# Patient Record
Sex: FEMALE | Race: WHITE
[De-identification: names, ages, dates, MRNs, and addresses within clinical notes are randomized per-mention and may not be internally consistent; named-entity substitution may affect disease eponyms.]

---

## 2018-01-09 ENCOUNTER — HOSPITAL ENCOUNTER (OUTPATIENT)
Dept: HOSPITAL 35 - PAIN | Age: 68
Discharge: HOME | End: 2018-01-09
Attending: ANESTHESIOLOGY
Payer: COMMERCIAL

## 2018-01-09 VITALS — WEIGHT: 153.4 LBS | BODY MASS INDEX: 22.72 KG/M2 | HEIGHT: 69.02 IN

## 2018-01-09 VITALS — SYSTOLIC BLOOD PRESSURE: 139 MMHG | DIASTOLIC BLOOD PRESSURE: 84 MMHG

## 2018-01-09 DIAGNOSIS — Z79.899: ICD-10-CM

## 2018-01-09 DIAGNOSIS — G89.29: ICD-10-CM

## 2018-01-09 DIAGNOSIS — M51.36: ICD-10-CM

## 2018-01-09 DIAGNOSIS — M47.817: Primary | ICD-10-CM

## 2018-01-09 DIAGNOSIS — M46.96: ICD-10-CM

## 2018-01-09 DIAGNOSIS — Z79.82: ICD-10-CM

## 2018-01-09 DIAGNOSIS — Z98.890: ICD-10-CM

## 2018-01-16 ENCOUNTER — HOSPITAL ENCOUNTER (OUTPATIENT)
Dept: HOSPITAL 35 - PAIN | Age: 68
Discharge: HOME | End: 2018-01-16
Attending: ANESTHESIOLOGY
Payer: COMMERCIAL

## 2018-01-16 VITALS — BODY MASS INDEX: 22.75 KG/M2 | HEIGHT: 69.02 IN | WEIGHT: 153.6 LBS

## 2018-01-16 VITALS — SYSTOLIC BLOOD PRESSURE: 136 MMHG | DIASTOLIC BLOOD PRESSURE: 87 MMHG

## 2018-01-16 DIAGNOSIS — M51.36: ICD-10-CM

## 2018-01-16 DIAGNOSIS — Z79.82: ICD-10-CM

## 2018-01-16 DIAGNOSIS — M46.96: ICD-10-CM

## 2018-01-16 DIAGNOSIS — M47.817: Primary | ICD-10-CM

## 2018-01-16 DIAGNOSIS — Z79.899: ICD-10-CM

## 2018-01-16 DIAGNOSIS — G89.29: ICD-10-CM

## 2018-01-23 ENCOUNTER — HOSPITAL ENCOUNTER (OUTPATIENT)
Dept: HOSPITAL 35 - PAIN | Age: 68
Discharge: HOME | End: 2018-01-23
Attending: ANESTHESIOLOGY
Payer: COMMERCIAL

## 2018-01-23 VITALS — SYSTOLIC BLOOD PRESSURE: 110 MMHG | DIASTOLIC BLOOD PRESSURE: 75 MMHG

## 2018-01-23 VITALS — BODY MASS INDEX: 22.05 KG/M2 | HEIGHT: 69.02 IN | WEIGHT: 148.9 LBS

## 2018-01-23 DIAGNOSIS — G89.29: ICD-10-CM

## 2018-01-23 DIAGNOSIS — Z79.82: ICD-10-CM

## 2018-01-23 DIAGNOSIS — M51.36: ICD-10-CM

## 2018-01-23 DIAGNOSIS — Z79.899: ICD-10-CM

## 2018-01-23 DIAGNOSIS — M46.96: ICD-10-CM

## 2018-01-23 DIAGNOSIS — M47.817: Primary | ICD-10-CM

## 2018-03-13 ENCOUNTER — HOSPITAL ENCOUNTER (OUTPATIENT)
Dept: HOSPITAL 35 - RAD | Age: 68
End: 2018-03-13
Attending: FAMILY MEDICINE
Payer: COMMERCIAL

## 2018-03-13 DIAGNOSIS — Z12.31: Primary | ICD-10-CM

## 2018-11-20 ENCOUNTER — HOSPITAL ENCOUNTER (OUTPATIENT)
Dept: HOSPITAL 35 - PAIN | Age: 68
Discharge: HOME | End: 2018-11-20
Attending: ANESTHESIOLOGY
Payer: COMMERCIAL

## 2018-11-20 VITALS — DIASTOLIC BLOOD PRESSURE: 91 MMHG | SYSTOLIC BLOOD PRESSURE: 146 MMHG

## 2018-11-20 VITALS — HEIGHT: 69.02 IN | BODY MASS INDEX: 22.93 KG/M2 | WEIGHT: 154.8 LBS

## 2018-11-20 DIAGNOSIS — G89.29: ICD-10-CM

## 2018-11-20 DIAGNOSIS — I10: ICD-10-CM

## 2018-11-20 DIAGNOSIS — Z79.82: ICD-10-CM

## 2018-11-20 DIAGNOSIS — Z98.890: ICD-10-CM

## 2018-11-20 DIAGNOSIS — M51.36: Primary | ICD-10-CM

## 2018-11-20 DIAGNOSIS — Z87.891: ICD-10-CM

## 2018-11-20 DIAGNOSIS — M46.96: ICD-10-CM

## 2018-11-20 DIAGNOSIS — Z79.899: ICD-10-CM

## 2018-11-20 DIAGNOSIS — M47.817: ICD-10-CM

## 2018-12-05 ENCOUNTER — HOSPITAL ENCOUNTER (OUTPATIENT)
Dept: HOSPITAL 35 - PAIN | Age: 68
Discharge: HOME | End: 2018-12-05
Attending: ANESTHESIOLOGY
Payer: COMMERCIAL

## 2018-12-05 VITALS — HEIGHT: 69.02 IN | WEIGHT: 153.4 LBS | BODY MASS INDEX: 22.72 KG/M2

## 2018-12-05 VITALS — SYSTOLIC BLOOD PRESSURE: 116 MMHG | DIASTOLIC BLOOD PRESSURE: 82 MMHG

## 2018-12-05 DIAGNOSIS — G89.29: ICD-10-CM

## 2018-12-05 DIAGNOSIS — Z87.891: ICD-10-CM

## 2018-12-05 DIAGNOSIS — M46.96: ICD-10-CM

## 2018-12-05 DIAGNOSIS — M51.36: ICD-10-CM

## 2018-12-05 DIAGNOSIS — Z98.890: ICD-10-CM

## 2018-12-05 DIAGNOSIS — Z79.899: ICD-10-CM

## 2018-12-05 DIAGNOSIS — M47.817: Primary | ICD-10-CM

## 2018-12-05 DIAGNOSIS — I10: ICD-10-CM

## 2018-12-05 DIAGNOSIS — Z79.82: ICD-10-CM

## 2018-12-12 ENCOUNTER — HOSPITAL ENCOUNTER (OUTPATIENT)
Dept: HOSPITAL 35 - PAIN | Age: 68
Discharge: HOME | End: 2018-12-12
Attending: ANESTHESIOLOGY
Payer: COMMERCIAL

## 2018-12-12 VITALS — HEIGHT: 69.02 IN | BODY MASS INDEX: 23.08 KG/M2 | WEIGHT: 155.8 LBS

## 2018-12-12 VITALS — DIASTOLIC BLOOD PRESSURE: 90 MMHG | SYSTOLIC BLOOD PRESSURE: 130 MMHG

## 2018-12-12 DIAGNOSIS — Z87.891: ICD-10-CM

## 2018-12-12 DIAGNOSIS — Z79.899: ICD-10-CM

## 2018-12-12 DIAGNOSIS — Z79.82: ICD-10-CM

## 2018-12-12 DIAGNOSIS — G89.29: ICD-10-CM

## 2018-12-12 DIAGNOSIS — M12.88: ICD-10-CM

## 2018-12-12 DIAGNOSIS — M51.16: Primary | ICD-10-CM

## 2018-12-12 DIAGNOSIS — M47.27: ICD-10-CM

## 2018-12-12 DIAGNOSIS — I10: ICD-10-CM

## 2019-03-19 ENCOUNTER — HOSPITAL ENCOUNTER (OUTPATIENT)
Dept: HOSPITAL 35 - RAD | Age: 69
End: 2019-03-19
Attending: FAMILY MEDICINE
Payer: COMMERCIAL

## 2019-03-19 DIAGNOSIS — Z12.31: Primary | ICD-10-CM

## 2019-11-19 ENCOUNTER — HOSPITAL ENCOUNTER (OUTPATIENT)
Dept: HOSPITAL 35 - PAIN | Age: 69
Discharge: HOME | End: 2019-11-19
Attending: ANESTHESIOLOGY
Payer: COMMERCIAL

## 2019-11-19 VITALS — SYSTOLIC BLOOD PRESSURE: 129 MMHG | DIASTOLIC BLOOD PRESSURE: 85 MMHG

## 2019-11-19 VITALS — WEIGHT: 154.2 LBS | HEIGHT: 69.02 IN | BODY MASS INDEX: 22.84 KG/M2

## 2019-11-19 DIAGNOSIS — Z87.891: ICD-10-CM

## 2019-11-19 DIAGNOSIS — M47.816: ICD-10-CM

## 2019-11-19 DIAGNOSIS — M16.0: ICD-10-CM

## 2019-11-19 DIAGNOSIS — Z98.890: ICD-10-CM

## 2019-11-19 DIAGNOSIS — Z79.899: ICD-10-CM

## 2019-11-19 DIAGNOSIS — M51.36: ICD-10-CM

## 2019-11-19 DIAGNOSIS — G89.29: ICD-10-CM

## 2019-11-19 DIAGNOSIS — M47.817: Primary | ICD-10-CM

## 2019-11-19 DIAGNOSIS — Z79.82: ICD-10-CM

## 2019-11-19 NOTE — NUR
Pain Clinic Assessment:
 
1. History of Osteoarthritis:
LOW BACK
BILATERAL HIPS
   History of Rheumatoid Arthritis:
NO
 
2. Height: 5 ft. 9 in. 175.3 cm.
   Weight: 154.2 lb.  oz. 69.945 kg.
   Patient's BMI: 22.8
 
3. Vital Signs:
   BP: 129/85 Pulse: 69 Resp: 18
   Temp:  02 Sat: 96 ECG Mon:
 
4. Pain Intensity: 1 THIS MORNING. 5 MOVING
 
5. Fall Risk:
   Dizziness: N  Needs help standing or walking: N
   Fallen in the last 3 months: N
   Fall risk comments:
 
 
6. Patient on Blood Thinner: None
 
7. History of Hypertension: Y
 
8. Opioid Therapy greater than 6 weeks: N
   Opiate Contract Signed:
 
9. Risk Assessment Tool Provided: LOW RISK 0/3
 
10. Functional Assessment Tool: 22/70
 
11. Recreational Drug Use: Past greater than 3 mos Drug Type:
    Tobacco Use: Former Smoker Tobacco Type:
       Amount or Packs/day:  How Many Years:
    Alcohol Use: Yes  Frequency: Weekly Quant: 2-3 SHAYNE

## 2019-11-26 ENCOUNTER — HOSPITAL ENCOUNTER (OUTPATIENT)
Dept: HOSPITAL 35 - PAIN | Age: 69
Discharge: HOME | End: 2019-11-26
Attending: ANESTHESIOLOGY
Payer: COMMERCIAL

## 2019-11-26 VITALS — WEIGHT: 157 LBS | BODY MASS INDEX: 23.25 KG/M2 | HEIGHT: 69.02 IN

## 2019-11-26 VITALS — DIASTOLIC BLOOD PRESSURE: 82 MMHG | SYSTOLIC BLOOD PRESSURE: 117 MMHG

## 2019-11-26 DIAGNOSIS — I10: ICD-10-CM

## 2019-11-26 DIAGNOSIS — G89.29: ICD-10-CM

## 2019-11-26 DIAGNOSIS — Z87.891: ICD-10-CM

## 2019-11-26 DIAGNOSIS — Z79.899: ICD-10-CM

## 2019-11-26 DIAGNOSIS — M47.816: ICD-10-CM

## 2019-11-26 DIAGNOSIS — Z98.890: ICD-10-CM

## 2019-11-26 DIAGNOSIS — M47.817: Primary | ICD-10-CM

## 2019-11-26 DIAGNOSIS — M51.36: ICD-10-CM

## 2019-11-26 DIAGNOSIS — Z79.82: ICD-10-CM

## 2019-11-26 NOTE — HPC
[facility]
[address]
[place]   [address]                     PAIN MANAGEMENT CONSULTATION  
_______________________________________________________________________________
 
Name:       WILLARD MARCOS           Room #:                     REG [facility].#:      [id number]                       Account #:      [id number]  
Admission:  11/19/19    Attend Phys:    Bernardo Olivo DO  
Discharge:              Date of Birth:  [birth year]  
                                                          Report #: [id number]
                                                                    [id number]   
_______________________________________________________________________________
THIS REPORT FOR:   //name//                          
 
CC: Bernardo Gomez
 
DATE OF SERVICE:  11/19/2019
 
 
REFERRING PHYSICIAN:  Rajesh Gomez MD
 
CHIEF COMPLAINT:  Axial back pain.
 
HISTORY OF PRESENT ILLNESS:  As you know, the patient is a 69-year-old female
who returns today in followup visit reporting very slow and recurring axial back
pain due to facet arthropathy of the lumbar spine.  The patient underwent
radiofrequency lesioning of the medial branch nerves of the lumbar spine at our
visit of 12/12/2018 with good and prolonged benefit.  She reports improvement
with that of 100% for nearly 8 months with a slow and progressive return of
pain.  She returns today to undergo medial branch blocks in hopes of progressing
towards radiofrequency lesioning.  The patient indicates no new injury or
trauma.  Pain score today is rated at 1/10 up to 5/10 with ambulation.
 
ALLERGIES:  No known drug allergies.
 
CURRENT MEDICATIONS:  [place] Matrix 5000 mg ER, aspirin 81 mg per day, omega-3
fish oil 1000 mg once a day, cholecalciferol 5000 units once a day, ibuprofen
400 mg b.i.d., lisinopril 20 mg per day.
 
SOCIAL HISTORY:  The patient denies tobacco, IV or illicit drug use.  Admits to
an intermittent alcohol beverage.  She is accompanied by her  present in
room today.  She is retired.
 
IMAGING:  No new imaging available.
 
PQRS:  The patient has known arthritic changes of the lumbar spine, bilateral
hips, no rheumatoid arthritis.  She is placing pain score today anywhere from
1-5/10 depending on activity.  She is not a fall risk, has not had a fall in
last 3 months, not on blood thinners, but is treated for hypertension.  She is
not on chronic opioids and has a low opioid addiction potential.  Pain impact
score 22/70, mild interference of daily activities secondary to pain.
 
PHYSICAL EXAMINATION:
VITAL SIGNS:  Blood pressure 129/85, pulse 69, respiratory rate 18 and
unlabored.  The patient is 96% on room air.  Height 5 feet 9 inches tall, weight
154.2 pounds, BMI calculated 22.8.
 
 
 
[address]   [address]                     PAIN MANAGEMENT CONSULTATION  
_______________________________________________________________________________
 
Name:       WILLARD MARCOS           Room #:                     REG CLI 
[facility]#:      [id number]                       Account #:      [id number]  
Admission:  11/19/19    Attend Phys:    Bernardo Olivo DO  
Discharge:              Date of Birth:  [birth year]  
                                                          Report #: [id number]
                                                                    [id number]   
_______________________________________________________________________________
GENERAL:  Well-developed, well-nourished, well-hydrated 69-year-old female
appearing stated age.  Pain is rated today around 1-5/10 depending on activity.
HEENT:  Normocephalic, atraumatic.  Pupils equal, round, reactive to light.
EXTREMITIES:  Show no clubbing, no cyanosis, no edema.
MUSCULOSKELETAL:  Lower extremity strength equal and symmetrical 5/5, intact to
light touch from L1 through S2 dermatomes.  Seated straight leg raising
negative.  Supine straight leg raising negative.  Modified Gaenslen's positive
for axial low back pain.  Lumbar provocation testing is met with increasing
pain.
 
ASSESSMENT:
1.  Lumbosacral spondylosis without radicular symptoms.
2.  Facet arthropathy of the lumbar spine.
3.  Degeneration of lumbar spine.
4.  Chronic intractable pain.
 
PLAN:
1.  The patient returns today in followup visit to undergo medial branch nerve
blocks with plans to undergo radiofrequency lesioning of medial branch nerves if
this is successful.  She has had a slow and progressive return of symptoms, of
100% improvement in overall pain with previous radiofrequency lesioning
performed in December.  She returns today for medial branch blocks with plans to
move forward with a radiofrequency lesioning if this is successful.  She has
been advised of risks and benefits of procedure, states understood and wished to
proceed.
2.  No medication changes made at today's visit.  The patient will continue
current medical therapy.
3.  We will see the patient back in followup visit if she receives greater than
80% improvement in overall pain with the medial branch blocks today to undergo
radiofrequency lesioning of either the right or left side initially
 
DESCRIPTION OF PROCEDURE:  Bilateral L3, L4, L5 medial branch blocks under
fluoroscopic guidance.
 
This is the first of 2 diagnostic medial branch blocks on the right and left
sides that the patient is undergoing.
 
After obtaining written consent, the patient was taken back to the fluoroscopy
suite and placed in a prone position on the fluoroscopy table with a pillow
under the abdomen to decrease the lumbar lordosis.  The skin overlying the
lumbosacral area was prepped and draped in an aseptic fashion.  The L4
transverse process corresponding to the L3 medial branch nerve and the L5
transverse process corresponding to the L4 medial branch nerve on the right and
left sides were visualized under AP fluoroscopy.  The skin and subcutaneous
tissue overlying the target sites of injection was anesthetized using 2 mL of 1%
lidocaine.  A 22-gauge 3-1/2 inch spinal needle with a bent tip was advanced
 
 
 
[address]   [address]                     PAIN MANAGEMENT CONSULTATION  
_______________________________________________________________________________
 
Name:       WILLARD MARCOS           Room #:                     REG [facility]#:      [id number]                       Account #:      [id number]  
Admission:  11/19/19    Attend Phys:    Bernardo Olivo DO  
Discharge:              Date of Birth:  [birth year]  
                                                          Report #: [id number]
                                                                    [id number]   
_______________________________________________________________________________
under fluoroscopic guidance using a superior to inferior and lateral to medial
approach to the dorsal, superior and medial aspect of the base of the transverse
processes.  The needles were then directed ventral, medial and caudad to reach
the target locations.  An oblique view facilitated needle placement with
properly positioned needless in the middle of the "eye" of the German dog for
the medial branch blocks.  At each site the needles rested on periosteum.  After
negative aspiration for heme or CSF, 0.2 mL of Omnipaque dye was injected at
each site under live fluoroscopy, demonstrating absence of vascular uptake. 
After negative aspiration for heme or CSF, 0.5 mL of bupivacaine 0.5% was slowly
injected at each site to avoid forcing the solution away from the target points.
 The needles were then removed.
 
The L5 dorsal ramus block on the right and left sides was performed using a
slightly oblique approach under fluoroscopic guidance, placing the needle within
the groove between the sacral site and the superior articular process of S1. 
The needle rested on periosteum.  After negative aspiration for heme or CSF, 0.2
mL of Omnipaque dye was injected at under live fluoroscopy, demonstrating
absence of vascular uptake.  After negative aspiration for heme or CSF, 0.5 mL
of bupivacaine 0.5% was slowly injected to avoid forcing the solution away from
the target point.  The needle was then removed.  Sterile bandages were placed
over the injection site.
 
There were no apparent complications.  The patient tolerated the procedure well
and was carefully escorted to the recovery room in stable condition.  The VAS
was 5/10 before the procedure and 0/10, 10 minutes after the procedure.  After
meeting discharge criteria, the patient was discharged home.
 
 
 
 
 
 
 
 
 
 
 
 
 
 
 
 
 
 
  <ELECTRONICALLY SIGNED>
   By: Bernardo Olivo DO          
  11/26/19     1627
D: 11/20/19 1008                           _____________________________________
T: 11/20/19 1246                           Bernardo Olivo DO            /nt

## 2019-11-26 NOTE — NUR
Pain Clinic Assessment:
 
1. History of Osteoarthritis:
LOW BACK
BILATERAL HIPS
   History of Rheumatoid Arthritis:
NO
 
2. Height: 5 ft. 9 in. 175.3 cm.
   Weight: 157.0 lb.  oz. 71.215 kg.
   Patient's BMI: 23.2
 
3. Vital Signs:
   BP: 117/82 Pulse: 80 Resp: 16
   Temp:  02 Sat: 97 ECG Mon:
 
4. Pain Intensity: 2 THIS MORNING. 8 LIFT
 
5. Fall Risk:
   Dizziness: N  Needs help standing or walking: N
   Fallen in the last 3 months: N
   Fall risk comments:
 
 
6. Patient on Blood Thinner: None
 
7. History of Hypertension: Y
 
8. Opioid Therapy greater than 6 weeks: N
   Opiate Contract Signed:
 
9. Risk Assessment Tool Provided: LOW RISK 0/3
 
10. Functional Assessment Tool: 22/70
 
11. Recreational Drug Use: Past greater than 3 mos Drug Type:
    Tobacco Use: Former Smoker Tobacco Type:
       Amount or Packs/day:  How Many Years:
    Alcohol Use: Yes  Frequency:  Quant:

## 2019-11-27 NOTE — HPC
[facility]
[address] [place]   [address]                     PAIN MANAGEMENT CONSULTATION  
_______________________________________________________________________________
 
Name:       WILLARD MARCOS           Room #:                     REG [facility].#:      [id number]                       Account #:      [id number]  
Admission:  11/26/19    Attend Phys:    Bernardo Olivo DO  
Discharge:              Date of Birth:  [birth year]  
                                                          Report #: [id number]
                                                                    [id number]   
_______________________________________________________________________________
THIS REPORT FOR:   //name//                          
 
CC: Bernardo Gomez MD
 
DATE OF SERVICE:  11/26/2019
 
 
CHIEF COMPLAINT:  Axial back pain.
 
HISTORY OF PRESENT ILLNESS:  As you know, the patient is a 69-year-old female
returning in followup visit to begin radiofrequency lesioning of the medial
branch nerves of the lumbar spine.  She has successfully completed medial branch
blocks with good efficacy, noting near 100% improvement in overall pain
initially with the block and a slow progressive return of symptoms to its
baseline level.  She returns today to undergo right L3, L4, L5 medial branch
radiofrequency lesioning.  If this is unsuccessful, then move forward with left
L3, L4, L5 radiofrequency lesioning next week.  She indicates today pain level
of around 2-8/10 depending on activity.
 
ALLERGIES:  No known drug allergies.
 
CURRENT MEDICATIONS:  See chart.
 
SOCIAL HISTORY:  The patient denies tobacco, IV or illicit drug use.  Admits to
an occasional alcohol beverage.  She is accompanied by her  present in
room today.
 
IMAGING:  No new imaging available.
 
PQRS:  The patient has known arthritic changes of the lumbar spine, bilateral
hips, no rheumatoid arthritis.  Placing current pain score anywhere from 2-8/10
depending on activities, not a fall risk, has not had a fall in the last 3
months, not on blood thinners, but is treated for hypertension.  She is not on
chronic opioids and does have a low opioid addiction potential.  Pain impact
score 22/70 indicating mild interference of daily activities secondary to pain.
 
PHYSICAL EXAMINATION:
VITAL SIGNS:  Blood pressure 117/82, pulse 80, respiratory rate 16 and
unlabored.  The patient is 97% on room air.  Height 5 feet 9 inches tall, weight
157 pounds, BMI calculated 23.2.
GENERAL:  Well-developed, well-nourished, well-hydrated 69-year-old female
appearing stated age, pain is rated today around 2-8/10 depending on activity.
HEENT:  Normocephalic, atraumatic.  Pupils equal, round, reactive to light. 
Speech fluent.
 
 
 
[address]   [address]                     PAIN MANAGEMENT CONSULTATION  
_______________________________________________________________________________
 
Name:       WILLARD MARCOS           Room #:                     REG [facility] 
[facility]#:      [id number]                       Account #:      [id number]  
Admission:  11/26/19    Attend Phys:    Bernardo Olivo DO  
Discharge:              Date of Birth:  [birth year]  
                                                          Report #: [id number]
                                                                    [id number]   
_______________________________________________________________________________
EXTREMITIES:  Show no clubbing, no cyanosis, and no edema.
MUSCULOSKELETAL:  Lower extremity strength appears symmetrical 5/5, intact to
light touch from L1 through S2 dermatomes.  Seated straight leg raising
negative.  Supine straight leg raising negative.  Modified Gaenslen's positive
for axial low back pain.  Lumbar provocation testing including extension,
rotation, lateral flexion of lumbar spine causes intensification of pain. 
Slight improvement in symptoms with forward flexion.
 
ASSESSMENT:
1.  Lumbosacral spondylosis without radiculopathy.
2.  Facet arthropathy of the lumbar spine.
3.  Degeneration of lumbar spine.
4.  Chronic intractable pain.
 
PLAN:
1.  The patient returns today in followup visit having completed medial branch
blocks with successful improvement in overall pain lasting for the prescribed
amount of time.  She returns today to begin the radiofrequency lesioning
process.  She states the right side is worse than the left and wishes to begin
with the right side addressing the L3, L4, L5 medial branch nerves on the right
with plans to undergo radiofrequency lesioning of the left medial branch nerves
at our followup visit next week.  The patient has been advised risks and
benefits of the procedure, states understood and wished to proceed.
2.  No medication changes made at today's visit.  The patient will continue
current medical therapy as previously prescribed.
3.  We will see the patient back in followup visit next week to undergo left L3,
L4, L5 medial branch radiofrequency lesionings.
 
DESCRIPTION OF PROCEDURE:  Right L3, L4, L5 lumbar median branch radiofrequency
ablations.
 
After the procedure was explained, informed consent was obtained from the
patient.  The patient was informed of the risks of procedure including
infection, bleeding, nerve damage, failure to produce pain relief and
postoperative discomfort lasting for several weeks.  After confirmation of the
consent and understanding of her risks, the patient was then taken back to
fluoroscopy suite, placed in prone position with pillow under abdomen to
decrease lumbar lordosis.  Skin overlying lumbosacral area then prepped and
draped in aseptic fashion.  The L2 through L5 vertebral bodies and the sacral
ala were identified under AP fluoroscopy.  The target locations of the L4
transverse process corresponding to the L3 medial branch nerve and the L5
transverse process corresponding to the L4 medial branch nerve were established.
 Then using a 27-gauge 1-1/4 inch needle, skin wheals were placed at the
junction of the transverse process and the superior articular process of the
caudad vertebrae.  We used 1 mL at each of the sites.  We were careful to
anesthetize only skin and not the deep tissues.  The radiofrequency lesioning
 
 
 
[address]   [address]                     PAIN MANAGEMENT CONSULTATION  
_______________________________________________________________________________
 
Name:       WILLARD MARCOS           [facility] #[facility]                     [facility]      [id number]                       Account #:      [id number]  
Admission:  11/26/19    Attend Phys:    Bernardo Olivo DO  
Discharge:              Date of Birth:  [birth year]  
                                                          Report #: [id number]
                                                                    [id number]   
_______________________________________________________________________________
needles were then advanced under fluoroscopic guidance using a superior to
inferior, lateral to medial approach to the dorsal superior and medial aspect of
the base of the transverse processes.  Needles were then directed caudad to
reach target locations.  Oblique view facilitated needle placement with properly
positioned needles within the middle of the "eye" of the German dog.  At each
site, needles rested on periosteum.  Touching bone initially showed the needles
were not placed too deeply.
 
Radiofrequency lesioning of the L5 medial branch nerve on the right side was
performed using superior to inferior, lateral to medial approach under
fluoroscopic guidance, placing the needle between the groove of the sacral ala
and the superior articular process of S1.  Needle rested on periosteum.
 
Stimulation was performed at each level once the cannulas were in position. 
Sensory stimulation was performed at 0.2, 0.5, and 0.3 with impedance of 150,
148 and 125 at 50 Hz for the L3, L4, L5 medial branch nerves respectively.  Good
stimulation of the lumbar and buttock region was elicited indicating correct
alignment with the posterior primary ramus.  Absence of lower motor
fasciculation was noted at 3 volts 2 Hz stimulation of the L3, L4, L5 medial
branch nerves on the right side.  Following this, affirmation of dissociation
between sensory and motor stimulation, negative aspiration was noted for both
heme or cerebrospinal fluid at each level.  Next, 1 mL bupivacaine 0.5% was
injected slowly.  After a 90-second delay, lesions were performed at 80 degrees
Celsius for 90 seconds.  After the needle tips had cooled, 1 mL of a solution
containing 1 mL 40 mg per mL, 40 mg total triamcinolone and 3 mL of bupivacaine
0.5% was injected slowly.  Needles were retracted intermediate, flushed with 1 mL of
1% lidocaine and removed.  At the end of the procedure, the patient was able to
move all 4 extremities purposely.  Sterile bandages were placed over each
injection sites.
 
The patient tolerated procedure well, carefully escorted to recovery room in
stable condition.  No apparent complications.  After meeting discharge criteria,
the patient discharged home.
 
 
 
 
 
 
 
 
 
 
 
  <ELECTRONICALLY SIGNED>
   By: Bernardo Olivo DO          
  11/27/19     0747
D: 11/26/19 1622                           _____________________________________
T: 11/26/19 2332                           Bernardo Olivo DO            /nt

## 2019-12-03 ENCOUNTER — HOSPITAL ENCOUNTER (OUTPATIENT)
Dept: HOSPITAL 35 - PAIN | Age: 69
Discharge: HOME | End: 2019-12-03
Attending: ANESTHESIOLOGY
Payer: COMMERCIAL

## 2019-12-03 VITALS — SYSTOLIC BLOOD PRESSURE: 124 MMHG | DIASTOLIC BLOOD PRESSURE: 83 MMHG

## 2019-12-03 VITALS — BODY MASS INDEX: 23.25 KG/M2 | WEIGHT: 157 LBS | HEIGHT: 69.02 IN

## 2019-12-03 DIAGNOSIS — I10: ICD-10-CM

## 2019-12-03 DIAGNOSIS — M16.0: ICD-10-CM

## 2019-12-03 DIAGNOSIS — Z87.891: ICD-10-CM

## 2019-12-03 DIAGNOSIS — M47.817: Primary | ICD-10-CM

## 2019-12-03 DIAGNOSIS — G89.29: ICD-10-CM

## 2019-12-03 DIAGNOSIS — Z98.890: ICD-10-CM

## 2019-12-03 DIAGNOSIS — M47.816: ICD-10-CM

## 2019-12-03 DIAGNOSIS — M51.36: ICD-10-CM

## 2019-12-04 NOTE — HPC
[facility]
[place]   [address]                     PAIN MANAGEMENT CONSULTATION  
_______________________________________________________________________________
 
Name:       WILLARD MARCOS           Room #:                     REG [facility].#:      [id number]                       Account #:      [id number]  
Admission:  12/03/19    Attend Phys:    Bernardo Olivo DO  
Discharge:              Date of Birth:  [birth year]  
                                                          Report #: [id number]
                                                                    [id number]   
_______________________________________________________________________________
THIS REPORT FOR:   //name//                          
 
CC: Bernardo Gomez MD
 
DATE OF SERVICE:  12/03/2019
 
 
REFERRING PHYSICIAN:  Rajesh Gomez MD
 
CHIEF COMPLAINT:  Axial back pain.
 
HISTORY OF PRESENT ILLNESS:  As you know, the patient is a very pleasant
69-year-old female returning in followup visit to complete the radiofrequency
lesioning of the medial branch nerves of the lumbar spine.  She has successfully
completed right L3, L4, L5 medial branch radiofrequency lesioning at our last
visit with near 100% improvement in her right low back pain.  She continues to
experience left low back pain for which she places pain score 5/10.  She returns
today to undergo left L3, L4, L5 medial branch radiofrequency lesioning in hopes
of improving axial back pain, as she has seen with previous interventions
provided nearly a year ago.  She returns today in followup visit to complete the
process.  Again, the patient is reporting pain at around 5/10 involving just the
left low back.
 
ALLERGIES:  No known drug allergies.
 
CURRENT MEDICATIONS:  See chart.
 
SOCIAL HISTORY:  The patient denies tobacco, IV or illicit drug use.  Admits
occasional alcohol beverage.  She is accompanied by her  present in room
today.
 
IMAGING:  No new imaging available.
 
PQRS:  The patient has known arthritic changes of the lumbar spine, bilateral
hips, but no rheumatoid arthritis.  She is placing pain intensity today 5/10
involving the left low back.  She is not a fall risk.  She has not had a fall in
last 3 months.  She is not on blood thinners.  She is treated for hypertension. 
She is not on chronic opioids, but does have a low opioid addiction potential
based on our assessment tool.  Pain impact score 22/70 indicating moderate
interference of daily activities secondary to pain.
 
PHYSICAL EXAMINATION:
VITAL SIGNS:  Blood pressure 124/83, pulse 67, respiratory rate 14 and
unlabored.  The patient is 100% on room air.  Height 5 feet 9 inches tall,
 
 
 
[facility]
[address] [place]   [address]                     PAIN MANAGEMENT CONSULTATION  
_______________________________________________________________________________
 
Name:       WILLARD MARCOS           Room #:                     REG [facility].#:      [id number]                       Account #:      [id number]  
Admission:  12/03/19    Attend Phys:    Bernardo Olivo DO  
Discharge:              Date of Birth:  [birth year]  
                                                          Report #: [id number]
                                                                    [id number]   
_______________________________________________________________________________
weight 157 pounds, BMI calculated 23.2.
GENERAL:  Well-developed, well-nourished, well-hydrated 69-year-old female
appearing stated age, placing current pain score 5/10.
HEENT:  Normocephalic, atraumatic.  Pupils equal, round, reactive to light. 
Speech is fluent.
EXTREMITIES:  Show no clubbing, no cyanosis, no edema.
MUSCULOSKELETAL:  Lower extremity strength remains symmetrical 5/5.  Muscle bulk
and tone equal and symmetrical in comparing left lower extremity to right. 
Modified Gaenslen's positive for axial low back pain.  Lumbar provocation
testing including extension, rotation, lateral flexion to the left, all
intensify pain, to the right negative.
 
ASSESSMENT:
1.  Lumbosacral spondylosis without radiculopathy.
2.  Facet arthropathy of the lumbar spine.
3.  Degeneration of lumbar spine.
4.  Chronic intractable pain.
 
PLAN:
1.  The patient returns today in followup visit to complete radiofrequency
lesioning of the medial branch nerves of the lumbar spine.  We have successfully
completed the right side with 100% resolution of right low back pain.  She
returns today in followup visit to undergo left L3, L4, L5 medial branch
radiofrequency lesioning to 5/10 pain residual on the left side.  She has been
advised of the risks and benefits of the procedure, states she understood and
wished to proceed.
2.  No medication changes made at today's visit.  The patient will continue
current medical therapy as previously prescribed.
3.  We will see the patient back in followup visit on an as needed basis if her
pain does return.  We are hopeful the patient will see good and prolonged
benefit with today's radiofrequency lesioning completing the radiofrequency
lesioning of the lumbar spine.
 
 
 
 
 
 
 
 
 
 
 
 
  <ELECTRONICALLY SIGNED>
   By: Bernardo Olivo DO          
  12/04/19     1258
D: 12/03/19 1734                           _____________________________________
T: 12/03/19 2216                           Bernardo Olivo DO            /nt

## 2019-12-04 NOTE — P
[facility]
[place]   [address]                     PROCEDURE REPORT              
_______________________________________________________________________________
 
Name:       WILLARD MARCOS           Room #:                     REG [facility].#:      [id number]                       Account #:      [id number]  
Admission:  12/03/19    Attend Phys:    Bernardo Olivo DO  
Discharge:              Date of Birth:  [birth year]  
                                                          Report #: [id number]
                                                                    [id number]   
_______________________________________________________________________________
THIS REPORT FOR:   //name//                          
 
CC: Bernardo Gomez MD
 
DATE OF SERVICE:  12/03/2019
 
 
PROCEDURE NOTE:
 
PROCEDURE PERFORMED:  Left L3, L4, L5 lumbar medial branch radiofrequency
lesionings.
 
DESCRIPTION OF PROCEDURE:  After the procedure was explained, informed consent
was obtained from the patient.  The patient was informed of the risks of
procedure including infection, bleeding, nerve damage, failure to produce pain
relief and postoperative discomfort lasting for several weeks.  After
confirmation and consent to undergo the procedure, the patient was then taken to
the fluoroscopy suite, placed in a prone position with pillow under abdomen to
decrease lumbar lordosis.  Skin overlying the lumbosacral area then prepped and
draped in aseptic fashion.  The L2 through L5 vertebral bodies and the sacral
ala were identified under AP fluoroscopy.  The target locations of the L4
transverse process corresponding to the L3 medial branch nerve, the L5
transverse process corresponding to the L4 medial branch nerve were established.
 
Using a 27-gauge, 1-1/4 inch needle, skin wheals were placed at the junction of
the transverse process and the superior articular process of the caudad
vertebrae.  We used 1 mL of 1% lidocaine at each site.  We were careful not to
anesthetize anything other than the skin and shallow subcutaneous tissues.
 
The radiofrequency lesioning needles were then advanced under fluoroscopic
guidance using a superior to inferior, lateral to medial approach to the dorsal
superior and medial aspect of the base of the transverse processes.  Needles
were then directed caudad to reach target location.  Oblique view facilitated
needle placement with properly positioned needles within the middle of the "eye"
 of the German dog.  At each site, needles rested on periosteum.  Touching bone
initially assured the needles were not placed too deeply.
 
Radiofrequency lesioning of the L5 medial branch nerve on the left side was
performed using a superior to inferior, lateral to medial approach under direct
fluoroscopic guidance.  We placed the needle within the groove between the
sacral ala and the superior articular process of S1.  Needle rested on
periosteum.
 
Stimulation was performed at each level once the cannulas were in position. 
 
 
 
[address]   [address]                     PROCEDURE REPORT              
_______________________________________________________________________________
 
Name:       WILLARD MARCOS           Room #:                     REG [facility]#:      [id number]                       Account #:      [id number]  
Admission:  12/03/19    Attend Phys:    Bernardo Olivo DO  
Discharge:              Date of Birth:  [birth year]  
                                                          Report #: [id number]
                                                                    [id number]   
_______________________________________________________________________________
Sensory stimulation was performed at 0.4, 0.5 and 0.5 with impedance of 248, 150
and 128 at 50 Hz for the L3, L4, L5 medial branch nerves on the left side
respectively.  Good stimulation of the lumbar and buttock region was elicited
indicating correct alignment with the posterior primary ramus.  Absence of lower
motor fasciculation was noted at 3 volts 2 Hz stimulation of the left L3, L4, L5
medial branch nerves respectively.  Following this, affirmation of dissociation
between sensory and motor stimulation, negative aspiration was noted at both
levels for heme or cerebrospinal fluid.  Next, 1 mL bupivacaine 0.5% was
injected slowly at each site.  After a 90-second delay, lesions were performed
at 80 degrees Celsius for 90 seconds.  After the needle tips had cooled, 1 mL of
a solution containing 1 mL 40 mg per mL, 40 mg total triamcinolone and 5 mL
bupivacaine 0.5% was injected slowly.  Needles were retracted custodial, flushed
with 1 mL of 1% lidocaine and removed.  At the end of the procedure.  The
patient was able to move all 4 extremities purposefully.  Sterile bandages were
placed over each injection site.
 
The patient tolerated procedure well, carefully escorted to recovery room in
stable condition.  No apparent complications.  After meeting discharge criteria,
the patient discharged home.
 
 
 
 
 
 
 
 
 
 
 
 
 
 
 
 
 
 
 
 
 
 
 
 
 
  <ELECTRONICALLY SIGNED>
   By: Bernardo Olivo DO          
  12/04/19     1258
D: 12/03/19 1734                           _____________________________________
T: 12/03/19 2217                           Bernardo Olivo DO            /nt

## 2020-02-11 ENCOUNTER — HOSPITAL ENCOUNTER (OUTPATIENT)
Dept: HOSPITAL 35 - MRI | Age: 70
End: 2020-02-11
Attending: NURSE PRACTITIONER
Payer: COMMERCIAL

## 2020-02-11 DIAGNOSIS — M50.11: ICD-10-CM

## 2020-02-11 DIAGNOSIS — M48.02: Primary | ICD-10-CM

## 2020-02-11 DIAGNOSIS — M40.292: ICD-10-CM

## 2020-02-11 DIAGNOSIS — M12.88: ICD-10-CM

## 2020-02-11 DIAGNOSIS — M51.34: ICD-10-CM

## 2020-02-18 ENCOUNTER — HOSPITAL ENCOUNTER (OUTPATIENT)
Dept: HOSPITAL 35 - PAIN | Age: 70
Discharge: HOME | End: 2020-02-18
Attending: ANESTHESIOLOGY
Payer: COMMERCIAL

## 2020-02-18 VITALS — SYSTOLIC BLOOD PRESSURE: 113 MMHG | DIASTOLIC BLOOD PRESSURE: 81 MMHG

## 2020-02-18 VITALS — BODY MASS INDEX: 23.34 KG/M2 | HEIGHT: 69.02 IN | WEIGHT: 157.6 LBS

## 2020-02-18 DIAGNOSIS — M47.22: ICD-10-CM

## 2020-02-18 DIAGNOSIS — M50.10: Primary | ICD-10-CM

## 2020-02-18 DIAGNOSIS — Z87.891: ICD-10-CM

## 2020-02-18 DIAGNOSIS — Z98.890: ICD-10-CM

## 2020-02-18 DIAGNOSIS — M48.02: ICD-10-CM

## 2020-02-18 DIAGNOSIS — Z79.899: ICD-10-CM

## 2020-02-18 DIAGNOSIS — G89.29: ICD-10-CM

## 2020-02-18 NOTE — NUR
Pain Clinic Assessment:
 
1. History of Osteoarthritis:
LOW BACK
BILATERAL HIPS
   History of Rheumatoid Arthritis:
NO
 
2. Height: 5 ft. 9 in. 175.3 cm.
   Weight: 157.6 lb.  oz. 71.487 kg.
   Patient's BMI: 23.3
 
3. Vital Signs:
   BP: 113/81 Pulse: 83 Resp: 18
   Temp:  02 Sat: 97 ECG Mon:
 
4. Pain Intensity: 5
 
5. Fall Risk:
   Dizziness: N  Needs help standing or walking: N
   Fallen in the last 3 months: N
   Fall risk comments:
 
 
6. Patient on Blood Thinner: None
 
7. History of Hypertension: Y
 
8. Opioid Therapy greater than 6 weeks: N
   Opiate Contract Signed:
 
9. Risk Assessment Tool Provided: LOW RISK 0/3
 
10. Functional Assessment Tool: 22/70
 
11. Recreational Drug Use: Past greater than 3 mos Drug Type:
    Tobacco Use: Former Smoker Tobacco Type:
       Amount or Packs/day:  How Many Years:
    Alcohol Use: Yes  Frequency:  Quant:

## 2020-02-25 NOTE — HPC
[facility]
[place]   [address]                     PAIN MANAGEMENT CONSULTATION  
_______________________________________________________________________________
 
Name:       WILLARD MARCOS           Room #:                     REG [facility].#:      [id number]                       Account #:      [id number]  
Admission:  02/18/20    Attend Phys:    Bernardo Olivo DO  
Discharge:              Date of Birth:  [birth year]  
                                                          Report #: [id number]
                                                                    [id number]   
_______________________________________________________________________________
THIS REPORT FOR:  
 
cc:  Rajesh Gomez MD, Neal A. MD Johnson, James E. DO                                          ~
THIS REPORT FOR:   //name//                          
 
 
 
DATE OF SERVICE:  02/18/2020
 
 
CHIEF COMPLAINT:  Neck pain, right upper extremity pain with paresthesias.
 
HISTORY OF PRESENT ILLNESS:  As you know, the patient is a very pleasant
70-year-old female who has returned today in followup visit per the request of
her primary care physician, Dr. Rajesh Gomez for evaluation for cervical
radiculopathy.  The patient has been complaining of ongoing neck pain, right
upper extremity pain with paresthesias that did not respond to conservative
treatment.  She underwent MRI of the cervical spine obtained on 02/11/2020.  The
findings were such the patient was referred to our clinic.
 
The patient indicates today pain is at a level of 5/10.  She describes the pain
as sharp, aching, numbness and tingling.  Pain is exacerbated with movement and
turning her head, improves with Medrol Dosepaks and rest.  She indicates that
she has a limited knowledge of what the findings of her MRI.  She was advised
she had some disk bulges and was sent to our clinic.  She has returned today per
the request of her PCP to discuss the possibility of undergoing treatment for
cervical radiculopathy.
 
ALLERGIES:  No known drug allergies.
 
CURRENT MEDICATIONS:  See chart.
 
SOCIAL HISTORY:  The patient denies tobacco, IV or illicit drug use.  Admits
occasional alcohol beverage.  She is a retired .  She is present
with her  in room today.
 
IMAGING:  MRI of cervical spine obtained 02/11/2020 shows multilevel
degenerative changes and facet arthropathy throughout the cervical spine with
straightening and mild kyphosis of the cervical spine resulting in multilevel
neural foraminal narrowing.  There is also noted severe spinal stenosis, most
prominent at the C5-C6 level and the C4-C5 level.  There is moderate central
canal stenosis at C3-C4 and C6-C7.
 
PHYSICAL EXAMINATION:
 
 
 
[address]   [address]                     PAIN MANAGEMENT CONSULTATION  
_______________________________________________________________________________
 
Name:       WILLARD MARCOS           Room #:                     REG [facility].#:      [id number]                       Account #:      [id number]  
Admission:  02/18/20    Attend Phys:    Bernardo Olivo DO  
Discharge:              Date of Birth:  [birth year]  
                                                          Report #: [id number]
                                                                    [id number]   
_______________________________________________________________________________
VITAL SIGNS:  Blood pressure 113/81, pulse 83, respiratory rate 18 and
unlabored.  The patient is 97% on room air.  Height 5 feet 9 inches tall, weight
157.6 pounds, and BMI calculated 23.3.
GENERAL:  Well-developed, well-nourished, well-hydrated 70-year-old female
appearing stated age.  Pain is rated today at around 6/10.
HEENT:  Normocephalic, atraumatic.  Pupils equal, round, reactive to light. 
Extraocular muscles are intact.  Sclerae nonicteric without injection.
NEUROLOGIC:  Cranial nerves 2 through 12 grossly intact.  Speech is fluent.  The
patient deemed a good historian.
EXTREMITIES:  Show no clubbing, no cyanosis, no edema.
MUSCULOSKELETAL:  Upper extremity strength appears symmetrical 5/5.  Slight
giveaway strength noted with biceps flexion and triceps extension on the right
when compared to left.  This is due to pain generation.  There is no atrophy of
the musculature.  There are no changes in skin color or texture overlying the
right or left upper extremity.  Spurling test positive on the right.  There is
some palpatory tenderness over the paraspinal musculature of the cervical spine.
 No spinous process tenderness.
 
ASSESSMENT:
1.  Cervical radiculopathy.
2.  Severe central canal stenosis of the cervical spine, multilevel.
3.  Displacement of cervical intervertebral disk with radiculopathy.
4.  Lumbosacral spondylosis with radiculopathy.
5.  Neural foraminal stenosis of the cervical spine.
6.  Cervical degeneration.
7.  Chronic intractable pain.
 
PLAN:
1.  Based on today's physical exam and history the patient has provided, the
description the patient uses in regards to pain, the likely source of the
patient's symptoms is cervical radiculopathy.  Review of the MRI shows severe
central canal stenosis at C4-C5 and C5-C6 as well as neural foraminal stenosis
in multilevel.  We discussed the findings of the MRI with the patient today
spending over 20 minutes discussing the findings and how they correlate to her
symptoms.  After we have discussed this, we discussed treatment options for the
patient today.  The following was discussed with the patient.
 
We discussed physical therapy, stretching exercises and traction techniques as
an option for treatment.  This could improve the patient's symptoms potentially.
 We discussed medication management, adding neuropathic pain medication such as
amitriptyline, nortriptyline, Cymbalta, Lyrica or gabapentin to treat
neuropathic symptoms.  We discussed cervical epidural injections under
fluoroscopic guidance as a way to treat current symptomology.  We also discussed
ultimately surgical decompression which will likely be necessary at the C4-C5
and C5-C6 level.  After reviewing the risks and benefits of all the proposed
treatment options, the patient chose to undergo cervical epidural injection
 
 
 
[address] [place]   [address]                     PAIN MANAGEMENT CONSULTATION  
_______________________________________________________________________________
 
Name:       WILLARD MARCOS           [facility]                     [facility]      [id number]                       Account #:      [id number]  
Admission:  02/18/20    Attend Phys:    Bernardo Olivo DO  
Discharge:              Date of Birth:  [birth year]  
                                                          Report #: [id number]
                                                                    [id number]   
_______________________________________________________________________________
under fluoroscopic guidance and has requested referral to Neurosurgery.
2.  The patient has been advised risks and benefits of a cervical epidural
injection.  These risks include but are not necessarily limited to bleeding,
bruising, infection, worsening pain, no relief of pain, also risk of temporary
or permanent muscle weakness, temporary or permanent nerve damage, possible
paralysis, post-dural puncture headache and death.  The patient states
understood and wished to proceed.
3.  The patient was provided a referral to return to see nurse Noemi ramirez at [facility].  The patient is well aware of
Erich stern's reputation and understanding of the pathology of
the cervical and lumbar region.  She wishes to follow up with [facility] stern in regards to evaluation for possible surgical options
with Dr. Deny Lofton.  The patient was given this referral to see [facility]rich stern as quickly as possible.  We have taken the liberty of
contacting Neurosurgery [facility] offices to obtain an appointment. 
The patient will also follow up with their offices tomorrow.
4.  We will keep you apprised of the patient's response to treatment for her
cervical radiculopathy.  We wish to thank you for the opportunity to see her
back in consultation.
 
PROCEDURE NOTE:  C7-T1 cervical epidural steroid injection under fluoroscopic
guidance.
 
This is the first procedure of the first series that the patient is undergoing.
 
After obtaining written consent, the patient was taken back to the fluoroscopy
suite and placed in a prone position with separate pillows under chest and
forehead to decrease cervical lordosis.  The skin overlying the cervical area
was prepped and draped in an aseptic fashion.  The C7-T1 vertebral interspace
was identified by AP fluoroscopy.  The skin and subcutaneous tissue overlying
the target site of injection was anesthetized using 3 mL of 1% lidocaine.
 
A 20-gauge 3-1/2 inch Tuohy needle was advanced under fluoroscopic guidance
toward the epidural space using a midline approach.  The epidural space was
identified using a loss of resistance to air technique.  After negative
aspiration for heme or cerebrospinal fluid, a total of 1 mL of Omnipaque was
injected.  A cervical epidurogram was confirmed using AP and oblique
fluoroscopy.  After negative aspiration for heme or cerebrospinal fluid, 5 mL of
a solution containing 2 mL 40 mg per mL, 80 mg total triamcinolone along with 3
mL of lidocaine 1% was injected in increments.  Contrast spread was noted from
posterior epidural space.  The needle was then retracted approximately correction
and the needle track was flushed with 1 mL of 1% lidocaine.  There were no
apparent new sensory deficits in the upper extremities present following the
procedure.  A sterile bandage was placed over the injection site.
 
The heart rate, pulse oximetry and blood pressure were continuously monitored
 
 
 
[address]   [address]                     PAIN MANAGEMENT CONSULTATION  
_______________________________________________________________________________
 
Name:       WILLARD MARCOS           Room #:                     REG [facility]#:      [id number]                       Account #:      [id number]  
Admission:  02/18/20    Attend Phys:    Bernardo Olivo DO  
Discharge:              Date of Birth:  [birth year]  
                                                          Report #: [id number]
                                                                    [id number]   
_______________________________________________________________________________
after the procedure.  There were no apparent complications.  The patient
tolerated the procedure well and was carefully escorted in the recovery room in
stable condition.  After meeting discharge criteria, the patient was discharged
home.
 
 
 
 
 
 
 
 
 
 
 
 
 
 
 
 
 
 
 
 
 
 
 
 
 
 
 
 
 
 
 
 
 
 
 
 
 
 
 
 
  <ELECTRONICALLY SIGNED>
   By: Bernardo Olivo DO          
  02/25/20     0745
D: 02/18/20 1609                           _____________________________________
T: 02/19/20 0128                           Bernardo Olivo DO            /nt

## 2020-05-13 ENCOUNTER — HOSPITAL ENCOUNTER (OUTPATIENT)
Dept: HOSPITAL 35 - BC | Age: 70
End: 2020-05-13
Attending: FAMILY MEDICINE
Payer: COMMERCIAL

## 2020-05-13 DIAGNOSIS — Z12.31: Primary | ICD-10-CM

## 2020-08-11 ENCOUNTER — HOSPITAL ENCOUNTER (OUTPATIENT)
Dept: HOSPITAL 35 - ULTRA | Age: 70
End: 2020-08-11
Attending: NURSE PRACTITIONER
Payer: COMMERCIAL

## 2020-08-11 DIAGNOSIS — R94.5: Primary | ICD-10-CM

## 2020-08-11 DIAGNOSIS — Z90.49: ICD-10-CM

## 2020-08-11 DIAGNOSIS — R23.9: ICD-10-CM

## 2020-08-11 DIAGNOSIS — L65.9: ICD-10-CM

## 2020-09-29 ENCOUNTER — HOSPITAL ENCOUNTER (OUTPATIENT)
Dept: HOSPITAL 35 - PAIN | Age: 70
Discharge: HOME | End: 2020-09-29
Attending: ANESTHESIOLOGY
Payer: COMMERCIAL

## 2020-09-29 VITALS — DIASTOLIC BLOOD PRESSURE: 85 MMHG | SYSTOLIC BLOOD PRESSURE: 130 MMHG

## 2020-09-29 VITALS — WEIGHT: 160.2 LBS | HEIGHT: 69.02 IN | BODY MASS INDEX: 23.73 KG/M2

## 2020-09-29 DIAGNOSIS — M54.5: Primary | ICD-10-CM

## 2020-09-29 DIAGNOSIS — Z87.891: ICD-10-CM

## 2020-09-29 DIAGNOSIS — Z79.899: ICD-10-CM

## 2020-09-29 DIAGNOSIS — G89.29: ICD-10-CM

## 2020-09-29 DIAGNOSIS — Z88.8: ICD-10-CM

## 2020-09-29 DIAGNOSIS — M47.817: ICD-10-CM

## 2020-09-29 DIAGNOSIS — M51.36: ICD-10-CM

## 2020-09-29 DIAGNOSIS — Z72.89: ICD-10-CM

## 2020-09-29 NOTE — NUR
Pain Clinic Assessment:
 
1. History of Osteoarthritis:
LOW BACK
BILATERAL HIPS
   History of Rheumatoid Arthritis:
DENIES
 
2. Height: 5 ft. 9 in. 175.3 cm.
   Weight: 160.2 lb.  oz. 72.666 kg.
   Patient's BMI: 23.6
 
3. Vital Signs:
   BP: 130/85 Pulse: 76 Resp: 16
   Temp:  02 Sat: 98 ECG Mon:
 
4. Pain Intensity: 6-7
 
5. Fall Risk:
   Dizziness: N  Needs help standing or walking: N
   Fallen in the last 3 months: N
   Fall risk comments:
 
 
6. Patient on Blood Thinner: None
 
7. History of Hypertension: Y
 
8. Opioid Therapy greater than 6 weeks: N
   Opiate Contract Signed:
 
9. Risk Assessment Tool Provided: LOW RISK 0/3
 
10. Functional Assessment Tool: 42/70
 
11. Recreational Drug Use: Past greater than 3 mos Drug Type:
    Tobacco Use: Former Smoker Tobacco Type:
       Amount or Packs/day:  How Many Years:
    Alcohol Use: Yes  Frequency: Weekly Quant:

## 2020-09-29 NOTE — HPC
[facility]
[address] [place]   [address]                     PAIN MANAGEMENT CONSULTATION  
_______________________________________________________________________________
 
Name:       WILLARD MARCOS           Room #:                     REG [facility].#:      [id number]                       Account #:      [id number]  
Admission:  09/29/20    Attend Phys:    Bernardo Olivo DO  
Discharge:              Date of Birth:  [birth year]  
                                                          Report #: [id number]
                                                                    [id number]   
_______________________________________________________________________________
THIS REPORT FOR:  
 
cc:  Rajesh Gomez MD, Neal A. MD Johnson, James E. DO                                          ~
CC: Bernardo Gomez MD
 
DATE OF SERVICE:  09/29/2020
 
 
REFERRING PHYSICIAN:  Rajesh Gomez MD.
 
CHIEF COMPLAINT:  Low back pain.
 
HISTORY OF PRESENT ILLNESS:  As you know, the patient is a very pleasant
70-year-old female who has returned today in followup visit with recurrent low
back pain.  As you are aware, the patient suffers from progressively worsening
osteoarthritic changes of the facet joints of the lumbar spine.  She has
undergone radiofrequency lesioning at our visit of 11/26/2019 with resolution of
symptoms until just recently.  We last saw the patient in regards to cervical
radiculopathy and she has undergone surgery to address this issue and has
completely resolved her symptoms.  Unfortunately, she continues to experience
recurrence of low back pain.  She returns today to undergo medial branch blocks
in hopes of improvement in symptoms. If these then are successful in alleviating
her axial back pain once again, then move forward with radiofrequency lesioning.
 The patient denies new injury, new trauma or any changes in medical history
since our last visit other than the cervical surgery that occurred earlier this
year.
 
ALLERGIES:  No known drug allergies.
 
CURRENT MEDICATIONS:  Probiotic, Kelp, lisinopril, ibuprofen, cholecalciferol,
omega-3 fish oil, aspirin, Iker Matrix.
 
SOCIAL HISTORY:  The patient denies tobacco, IV or illicit drug use.  Admits to
occasional alcohol beverage.  She is retired, accompanied by her  present
in room today.
 
IMAGING:  No new imaging available.
 
PQRS:  The patient has known arthritic changes of the cervical spine, lumbar
spine, bilateral hips and knees.  No rheumatoid arthritis.  She is placing
current pain score 6-7/10.  She is not a fall risk nor has she had a fall in
last 3 months.  She is not on blood thinners, but is treated for hypertension. 
 
 
 
[place] [address]                     PAIN MANAGEMENT CONSULTATION  
_______________________________________________________________________________
 
Name:       WILLARD MARCOS           Room #:                     REG [facility].#:      [id number]                       Account #:      [id number]  
Admission:  09/29/20    Attend Phys:    Bernardo Olivo DO  
Discharge:              Date of Birth:  [birth year]  
                                                          Report #: [id number]
                                                                    [id number]   
_______________________________________________________________________________
She is not on chronic opioids and has a low opiate addiction potential based on
our assessment tool.  Pain impact is 42/70, moderate to severe interference of
daily activities secondary to pain.
 
PHYSICAL EXAMINATION:
VITAL SIGNS:  Blood pressure 130/85, pulse 76, respiratory rate 16 and
unlabored.  The patient is 98% on room air.  Height 5 feet 9 inches tall, weight
160.2 pounds, BMI calculated 23.6.
GENERAL:  Well-developed, well-nourished, well-hydrated 70-year-old female
appearing stated age, pain is rated today around 6-7/10.
HEENT:  Normocephalic, atraumatic.  Pupils equal, round and reactive.
EXTREMITIES:  Show no clubbing, no cyanosis.  No appreciable edema.
MUSCULOSKELETAL:  Lower extremity strength is symmetrical again today 5/5,
intact to light touch from L1 through S2 dermatomes.  Seated straight leg
raising negative.  Supine straight leg raising negative.  Modified Gaenslen's
positive for axial low back pain.  Lumbar provocation testing including
extension, rotation, lateral flexion all intensify axial back pain.  Slight
forward flexion of lumbar spine improves axial back pain.
 
ASSESSMENT:
1.  Lumbosacral spondylosis without radiculopathy.
2.  Facet arthropathy of the lumbar spine.
3.  Degeneration of lumbar spine.
4.  Chronic intractable pain.
 
PLAN:
1.  The patient returns today in followup visit to address her axial back pain
issues.  She has done very well with radiofrequency lesioning, most recent was
done in November 2019.  Unfortunately, the patient's symptoms have begun to
return.  No inciting injury or trauma.  She returns to begin medial branch nerve
blocks and possible radiofrequency lesioning of the lumbar medial branch nerves
to address axial back pain.  She has been advised risks and benefits of the
procedure, states understood and wished to proceed.
2.  No medication changes made at today's visit.  The patient will continue
current medical therapy as prior prescribed.
3.  We will see the patient back in followup visit next week for possible
radiofrequency lesioning assuming she sees prolonged benefit that is appropriate
with the diagnostic medial branch blocks provided today.
 
PROCEDURE NOTE
 
DESCRIPTION OF PROCEDURE:  Bilateral L3, L4, L5 medial branch nerve blocks under
fluoroscopic guidance.
 
After obtaining written consent, the patient was taken back to fluoroscopy
suite, placed in prone position with a pillow under abdomen to decrease lumbar
 
 
 
[facility]
[address] [place]   [address]                     PAIN MANAGEMENT CONSULTATION  
_______________________________________________________________________________
 
Name:       WILLARD MARCOS           Room #:                     REG [facility]#:      [id number]                       Account #:      [id number]  
Admission:  09/29/20    Attend Phys:    Bernardo Olivo DO  
Discharge:              Date of Birth:  [birth year]  
                                                          Report #: [id number]
                                                                    [id number]   
_______________________________________________________________________________
lordosis.  Skin overlying lumbosacral area then prepped and draped in aseptic
fashion.  The L4 transverse process corresponding the L3 medial branch nerve and
the L5 transverse process corresponding the L4 medial branch nerve bilaterally
were visualized under fluoroscopic guidance.  The skin and subcutaneous tissue
overlying each of the sites of injection were anesthetized with 2 mL of 1%
lidocaine.
 
Six  22-gauge 3-1/2 inch spinal needles with bent tips were advanced under
fluoroscopic guidance using a superior, inferior, lateral to medial approach to
the dorsal superior and medial aspect of the base of the transverse processes. 
The needles were all then directed to reach their target locations.  Oblique
view facilitated needle placement with properly positioned needles within the
middle of the eye of the German dog for each of the medial branch blocks.  At
each site, needles rested on periosteum.  After negative aspiration for heme or
cerebrospinal fluid, 0.5 mL of bupivacaine 0.5% was injected slowly to avoid
forcing the solution away from the target points.  Needles were then removed.
 
The L5 dorsal ramus block, both on the left and right side was performed using a
slightly oblique approach under fluoroscopic guidance, placing the needles
within the groove between the sacral ala and the superior articular processes of
S1.  Needles rested on periosteum.  After negative aspiration for heme or
cerebrospinal fluid, 0.5 mL of bupivacaine 0.5% was injected slowly to avoid
forcing the solution away from the target points.  Needles were then removed. 
Sterile bandage placed over each of the injection sites.
 
The patient tolerated procedure well, carefully escorted to recovery room in
stable condition.  No apparent complications.  VAS before procedure rated at
6-7/10, VAS 10 minutes after procedure 0/10.
 
 
 
 
 
 
 
 
 
 
 
 
 
 
 
 
                         
   By:                               
                   
D: 09/30/20 1138                           _____________________________________
T: 09/30/20 1337                           Bernardo Olivo DO            /nt

## 2020-10-07 ENCOUNTER — HOSPITAL ENCOUNTER (OUTPATIENT)
Dept: HOSPITAL 35 - PAIN | Age: 70
Discharge: HOME | End: 2020-10-07
Attending: ANESTHESIOLOGY
Payer: COMMERCIAL

## 2020-10-07 VITALS — WEIGHT: 160.4 LBS | HEIGHT: 69.02 IN | BODY MASS INDEX: 23.76 KG/M2

## 2020-10-07 VITALS — DIASTOLIC BLOOD PRESSURE: 83 MMHG | SYSTOLIC BLOOD PRESSURE: 120 MMHG

## 2020-10-07 DIAGNOSIS — Z87.891: ICD-10-CM

## 2020-10-07 DIAGNOSIS — Z79.82: ICD-10-CM

## 2020-10-07 DIAGNOSIS — M47.816: ICD-10-CM

## 2020-10-07 DIAGNOSIS — Z72.89: ICD-10-CM

## 2020-10-07 DIAGNOSIS — Z79.899: ICD-10-CM

## 2020-10-07 DIAGNOSIS — M54.5: Primary | ICD-10-CM

## 2020-10-07 DIAGNOSIS — G89.29: ICD-10-CM

## 2020-10-07 NOTE — NUR
Pain Clinic Assessment:
 
1. History of Osteoarthritis:
LOW BACK
BILATERAL HIPS
   History of Rheumatoid Arthritis:
DENIES
 
2. Height: 5 ft. 9 in. 175.3 cm.
   Weight: 160.4 lb.  oz. 72.757 kg.
   Patient's BMI: 23.7
 
3. Vital Signs:
   BP: 120/83 Pulse: 77 Resp: 16
   Temp:  02 Sat: 99 ECG Mon:
 
4. Pain Intensity: 8
 
5. Fall Risk:
   Dizziness: N  Needs help standing or walking: N
   Fallen in the last 3 months: N
   Fall risk comments:
 
 
6. Patient on Blood Thinner: None
 
7. History of Hypertension: Y
 
8. Opioid Therapy greater than 6 weeks: N
   Opiate Contract Signed:
 
9. Risk Assessment Tool Provided: LOW RISK 0/3
 
10. Functional Assessment Tool: 42/70
 
11. Recreational Drug Use: Past greater than 3 mos Drug Type:
    Tobacco Use: Former Smoker Tobacco Type:
       Amount or Packs/day:  How Many Years:
    Alcohol Use: Yes  Frequency:  Quant:

## 2020-10-07 NOTE — HPC
[facility]
[address] [place]   [address]                     PAIN MANAGEMENT CONSULTATION  
_______________________________________________________________________________
 
Name:       WILLARD MARCOS           Room #:                     REG [facility].#:      [id number]                       Account #:      [id number]  
Admission:  10/07/20    Attend Phys:    Bernardo Olivo DO  
Discharge:              Date of Birth:  [birth year]  
                                                          Report #: [id number]
                                                                    [id number]   
_______________________________________________________________________________
CC: Benrardo Gomez MD
 
DATE OF SERVICE:  10/07/2020
 
 
CHIEF COMPLAINT:  Low back pain.
 
HISTORY OF PRESENT ILLNESS:  As you know, the patient is a very pleasant
70-year-old female returning in followup visit to undergo radiofrequency
lesioning of the bilateral medial branch nerves of the lumbar spine.  The
patient underwent medial branch blocks at our last visit with 100% improvement
in overall pain lasting for approximately 3 hours with a slow and progressive
return of symptoms over the next couple of hours back to her baseline level. 
She returns today in followup visit to undergo bilateral L3, L4, L5 medial
branch radiofrequency lesionings of the medial branch nerves of the lumbar
spine.  The patient denies injury or trauma that may have led to symptom
continuation.
 
ALLERGIES:  No known drug allergies.
 
CURRENT MEDICATIONS:  Probiotic help, lisinopril, ibuprofen, cholecalciferol,
omega-3 fish oil, aspirin, Hanna Matrix.
 
SOCIAL HISTORY:  The patient denies tobacco, IV or illicit drug use.  Admits
occasional alcohol beverage.  She is retired, retired years ago.  She is
accompanied by her  present in room today.
 
IMAGING:  No new imaging available.
 
PQRS:  The patient has known arthritic changes of the cervical spine, lumbar
spine, bilateral hips and knees.  No rheumatoid arthritis.  She is placing pain
intensity 8/10, not a fall risk, has not had a fall in last 3 months.  She is
not on blood thinners, but is treated for hypertension.  She is not on any
chronic opioids, has a low opiate addiction potential based on our assessment
tool.  Pain impact is 42/70, moderate to severe interference of daily activities
secondary to pain.
 
PHYSICAL EXAMINATION:
VITAL SIGNS:  Blood pressure 120/83, pulse 77, respiratory rate 16 and
unlabored, the patient is 99% on room air.  Height 5 feet 9 inches tall, weight
160.4 pounds, BMI calculated 23.7.
GENERAL:  Well-developed, well-nourished, well-hydrated 70-year-old female
appearing stated age, pain is rated today around 8/10.
HEENT:  Normocephalic, atraumatic.  Pupils equal, round and reactive to light. 
 
Speech is fluent.
EXTREMITIES:  Show no clubbing, no cyanosis.  No appreciable edema.
MUSCULOSKELETAL:  There is palpatory tenderness noted over the paraspinal
musculature of lower lumbar spine.  No spinous process tenderness.  Seated
straight leg raising negative.  Supine straight leg raising negative.  Modified
Gaenslen's positive for axial low back pain.  Lumbar provocation testing
including extension, rotation, lateral flexion all intensify axial back
symptoms.  There is no radiation of symptoms into the buttock or bilateral legs.
 
ASSESSMENT:
1.  Lumbosacral spondylosis without radiculopathy.
2.  Facet arthropathy of the lumbar spine.
3.  Degeneration of the lumbar spine.
4.  Chronic intractable pain.
 
PLAN:
1.  The patient returns today in followup visit having successfully completed
medial branch blocks that relieved her symptoms by 100% for over 3 hours with a
slow and progressive return of symptoms, which is appropriate for the medication
used during the diagnostic process.  She returns today to undergo radiofrequency
lesioning of the bilateral medial branch nerves to address L3, L4, L5, which has
been very effective for the patient in the past.  She has been advised risks and
benefits of the procedure.  The following was discussed with the patient in
regards to risks.
2.  We discussed the risks that include but are not necessarily limited to
bleeding, bruising, infection, worsening pain, no relief of pain, also risk of
temporary or permanent muscle weakness, temporary or permanent inadvertent nerve
damage, possible paralysis and death.  The patient states understood and wished
to proceed.
3.  No medication changes made at today's visit.  The patient will continue
current medical therapy as prior prescribed.
4.  We plan to see the patient back in followup visit on an as needed basis to
discuss any recurrence of axial back pain.  We are hopeful the patient will see
good and prolonged benefit with today's procedure.
 
PROCEDURE NOTE
 
DESCRIPTION OF PROCEDURE:  Bilateral L3, L4, L5, lumbar medial branch
radiofrequency lesionings.
 
After the procedure was explained, informed consent was obtained from the
patient.  The patient was informed of the risks of procedure including
infection, bleeding, nerve damage, failure to produce pain relief and
postoperative discomfort lasting for several weeks.
 
After confirmation of consent and understanding of her risks, the patient was
then taken to the fluoroscopic suite, placed in prone position with pillow under
abdomen to decrease lumbar lordosis.  Skin overlying lumbosacral area then
prepped and draped in aseptic fashion.  The L2 through L5 vertebral bodies and
the sacral ala were identified under AP fluoroscopy.  The target locations of
the L4 transverse process corresponding the L3 medial branch nerve and the L5
transverse process corresponding the L4 medial branch nerve were established
bilaterally.
 
Using a 27-gauge, 1-1/4 inch needle, skin wheals were placed at the junction of
the transverse process and superior articular process of the caudad vertebrae. 
We used only 1 mL at each site and were careful only to anesthetize skin and not
the deep tissues.  Radiofrequency lesioning needles were then advanced under
fluoroscopic guidance using a superior to inferior, lateral to medial approach
to the dorsal superior and medial aspect of the base of the transverse
 
processes.  Needles were directed caudad to reach target locations.  Oblique
view facilitated needle placement with properly positioned needles within the
middle of the "eye" of the German dog.  At each site, needles rested on
periosteum.  Touching bone initially assured these needles were not placed too
deeply.
 
Radiofrequency lesioning of the L5 medial branch nerve on both the left and
right side was performed using a superior, inferior, lateral to medial approach
under fluoroscopic guidance, placing the needle within the groove between the
sacral ala and the superior articular process of S1.  Both needles rested on
periosteum.
 
Stimulation at each site was performed once the cannulas were in position. 
Sensory stimulation was performed on the right at 0.2, 0.3 and 0.4 with
impedance of 110, 155 and 150 for the L3, L4, L5 medial branch nerves on the
right side.  Sensory stimulation was performed on the left side at 0.3, 0.4, 0.5
with impedance of 120, 125 and 200 for the L3, L4, L5 medial branch nerves on
the left side.  Good stimulation of the lumbar and buttock region was elicited
indicating correct alignment with the posterior primary ramus at each site. 
Absence of lower motor fasciculation was noted at 3 volts 2 Hz stimulation of
the L3, L4, L5 medial branch nerves, both on the left and right side.  Following
this, affirmation of dissociation between sensory and motor stimulation,
negative aspiration was noted at each site for heme or cerebrospinal fluid. 
Next, 1 mL bupivacaine 0.5% was injected slowly at each of the sites.  After a
90-second delay, lesions were performed at 80 degrees Celsius for 90 seconds. 
After the needle tips cooled, 1 mL of a solution containing 2 mL 40 mg per mL,
80 mg total triamcinolone and 6 mL bupivacaine 0.5% was injected slowly. 
Needles were then retracted approximately half way, flushed with 1 mL of 1%
lidocaine and removed.  At the end of the procedure, the patient was able to
move all 4 extremities purposefully.  Sterile bandages were placed over each
injection sites.
 
The patient tolerated procedure well, carefully escorted to recovery room in
stable condition.  No apparent complications.  After meeting discharge criteria,
the patient discharged home.
 
 
 
 
 
 
 
 
 
 
 
 
 
 
 
 
 
 
 
 
 
 
 
                         
   By:                               
                   
D: 10/13/20 1016                           _____________________________________
T: 10/13/20 1057                           Bernardo Olivo DO            /nt

## 2021-05-27 ENCOUNTER — HOSPITAL ENCOUNTER (OUTPATIENT)
Dept: HOSPITAL 35 - RAD | Age: 71
End: 2021-05-27
Attending: FAMILY MEDICINE
Payer: COMMERCIAL

## 2021-05-27 DIAGNOSIS — Z12.31: Primary | ICD-10-CM

## 2021-09-14 ENCOUNTER — HOSPITAL ENCOUNTER (OUTPATIENT)
Dept: HOSPITAL 35 - PAIN | Age: 71
Discharge: HOME | End: 2021-09-14
Attending: ANESTHESIOLOGY
Payer: COMMERCIAL

## 2021-09-14 VITALS — HEIGHT: 69.02 IN | WEIGHT: 161.2 LBS | BODY MASS INDEX: 23.88 KG/M2

## 2021-09-14 VITALS — DIASTOLIC BLOOD PRESSURE: 84 MMHG | SYSTOLIC BLOOD PRESSURE: 111 MMHG

## 2021-09-14 DIAGNOSIS — M51.36: ICD-10-CM

## 2021-09-14 DIAGNOSIS — Z79.899: ICD-10-CM

## 2021-09-14 DIAGNOSIS — M47.817: Primary | ICD-10-CM

## 2021-09-14 DIAGNOSIS — M19.90: ICD-10-CM

## 2021-09-14 DIAGNOSIS — I10: ICD-10-CM

## 2021-09-14 DIAGNOSIS — M47.816: ICD-10-CM

## 2021-09-14 DIAGNOSIS — Z98.890: ICD-10-CM

## 2021-09-14 DIAGNOSIS — G89.29: ICD-10-CM

## 2021-09-21 ENCOUNTER — HOSPITAL ENCOUNTER (OUTPATIENT)
Dept: HOSPITAL 35 - PAIN | Age: 71
Discharge: HOME | End: 2021-09-21
Attending: ANESTHESIOLOGY
Payer: COMMERCIAL

## 2021-09-21 VITALS — SYSTOLIC BLOOD PRESSURE: 134 MMHG | DIASTOLIC BLOOD PRESSURE: 84 MMHG

## 2021-09-21 VITALS — HEIGHT: 69.02 IN | WEIGHT: 162.8 LBS | BODY MASS INDEX: 24.11 KG/M2

## 2021-09-21 DIAGNOSIS — M47.816: ICD-10-CM

## 2021-09-21 DIAGNOSIS — M51.36: ICD-10-CM

## 2021-09-21 DIAGNOSIS — Z98.890: ICD-10-CM

## 2021-09-21 DIAGNOSIS — M19.90: ICD-10-CM

## 2021-09-21 DIAGNOSIS — M47.817: Primary | ICD-10-CM

## 2021-09-21 DIAGNOSIS — I10: ICD-10-CM

## 2021-09-21 DIAGNOSIS — G89.29: ICD-10-CM

## 2021-09-21 DIAGNOSIS — Z79.899: ICD-10-CM

## 2021-09-21 NOTE — HPC
[facility]
[address] [place]   [address]                     PAIN MANAGEMENT CONSULTATION  
_______________________________________________________________________________
 
Name:       WILLARD MARCOS           Room #:                     [facility]..#:      [id number]                       Account #:      [id number]  
Admission:  09/14/21    Attend Phys:    Bernardo Olivo DO  
Discharge:              Date of Birth:  [birth year]  
                                                          Report #: [id number]
                                                                    [id number] 
_______________________________________________________________________________
THIS REPORT FOR:  
 
cc:  Rajesh Gomez MD, Neal A. MD Johnson, James E. DO                                          ~
 
cc:     Rajesh Gomez MD
DATE OF SERVICE: 09/14/2021
 
CHIEF COMPLAINT:  Low back pain.
 
HISTORY OF PRESENT ILLNESS:  As you know, the patient is a very pleasant 
71-year-old female, returning in followup visit with recurrent low back pain.  
As you are aware, the patient underwent radiofrequency lesioning of the medial 
branch nerves of the lumbar spine with good efficacy reporting up to 100% 
improvement in overall pain lasting for nearly 11 months.  She returns today in 
followup visit, wishing to undergo medial branch nerve blocks and progressing 
towards radiofrequency lesioning, for which she has found excellent benefit in 
the past.  She denies injury or trauma that may have led to symptom 
reoccurrence.  She returns today for medial branch nerve blocks in preparation 
for lumbar medial branch nerve blocks if successful.
 
ALLERGIES:  No known drug allergies.
 
CURRENT MEDICATIONS:  Gabapentin, bacillus, lisinopril, ibuprofen, 
cholecalciferol, omega-3 fish oil, aspirin and biotin.
 
SOCIAL HISTORY:  The patient denies tobacco, alcohol or IV or illicit drug use. 
She is retired, retiring years ago, unaccompanied today.
 
IMAGING:  No new imaging available.
 
PQRS:  The patient has known arthritic changes of the cervical spine, lumbar 
spine, bilateral hips.  No rheumatoid arthritis.  She is placing pain intensity 
anywhere from 7-8/10.  She is not a fall risk, has not had a fall in last 3 
months.  She is not on blood thinners, but is treated for hypertension.  She is 
not on chronic opioids, has a low opioid addiction potential.  Pain impact is 
42/70, moderate interference of daily activities secondary to pain.
 
PHYSICAL EXAMINATION:
VITAL SIGNS:  Blood pressure 111/84, pulse 74, respiratory rate 16 and 
unlabored.  The patient 100% on room air.  Height 5 feet 9 inches tall, weight 
161.2 pounds, BMI calculated 23.8.
GENERAL:  Well-developed, well-nourished, well-hydrated 71-year-old female, 
appearing stated age.  Pain is rated today around 7-8/10.
HEENT:  Head is normocephalic, atraumatic.  Pupils are round and responsive.  
 
 
 
[address]   [address]                     PAIN MANAGEMENT CONSULTATION  
_______________________________________________________________________________
 
Name:       WILLARD MARCOS           Room #:                     [facility].#:      [id number]                       Account #:      [id number]  
Admission:  09/14/21    Attend Phys:    Bernardo Olivo DO  
Discharge:              Date of Birth:  [birth year]  
                                                          Report #: [id number]
                                                                    [id number] 
_______________________________________________________________________________
She is wearing a mask in compliance with COVID-19 regulations.
EXTREMITIES:  Show no clubbing, no cyanosis, no appreciable edema.
MUSCULOSKELETAL:  Lower extremity strength equal and symmetrical, 5/5.  Muscle 
bulk and tone equal and symmetrical in lower extremities.  Seated straight leg 
raising negative.  Supine straight leg raising negative.  Modified Gaenslen's 
positive for axial back pain.  Lumbar provocation testing is once again met with
increasing pain with extension, rotation, lateral flexion.
 
ASSESSMENT:
1.  Lumbosacral spondylosis with radiculopathy.
2.  Facet arthropathy of lumbar spine.
3.  Lumbar degeneration.
4.  Chronic intractable pain.
 
PLAN:
1.  The patient returns today in followup visit with recurrence of her low back 
pain typical for her facet arthropathy.  She received 11 months of improvement 
with the medial branch radiofrequency lesioning performed at our last visit.  
She is very pleased with response to this treatment.  She returns today in 
followup visit to begin treatment to address recurrent facet arthropathy pain 
beginning with medial branch nerve blocks.  If these are then successful, then 
move forward with radiofrequency lesioning.  The patient has been advised risks 
and benefits of the procedure, states understood.  She wished to proceed.
2.  No medication changes made at today's visit.  The patient will continue 
current medical therapy as prior prescribed.
3.  We will see the patient back in followup visit in 2 weeks.  At that time, 
review the efficacy of the medial branch nerve blocks and move forward with 
radiofrequency lesioning.
 
PROCEDURE NOTE:
 
DESCRIPTION OF PROCEDURE:  Bilateral L3, L4, L5 medial branch nerve blocks under
fluoroscopic guidance.
 
After obtaining written consent, the patient was taken back to fluoroscopy 
suite, placed in prone position with pillow under abdomen to decrease lumbar 
lordosis.  Skin overlying lumbosacral area then prepped and draped in aseptic 
fashion.  The L4 transverse process corresponding to the L3 medial branch nerves
and the L5 transverse process corresponding to the L4 medial branch nerves were 
visualized bilaterally under fluoroscopic guidance.  Skin and subcutaneous 
tissue overlying target site injection anesthetized with 2 mL of 1% lidocaine at
each site.
 
Four 22-gauge 3-1/2 inch spinal needles with bent tips were advanced under 
fluoroscopic guidance using a superior to inferior, lateral to medial approach 
to the dorsal, superior and medial aspect of the base of transverse processes.  
 
 
 
[address]   [address]                     PAIN MANAGEMENT CONSULTATION  
_______________________________________________________________________________
 
Name:       WILLARD MARCOS           Room #:                     REG [facility] DE#:      [id number]                       Account #:      [id number]  
Admission:  09/14/21    Attend Phys:    Bernardo Olivo DO  
Discharge:              Date of Birth:  [birth year]  
                                                          Report #: [id number]
                                                                    [id number] 
_______________________________________________________________________________
The needles were then directed caudad to reach the target locations.  Oblique 
view facilitated needle placement with properly positioned needles within the 
middle of the eye of the garett dog.  At each site, needles rested on 
periosteum.  After negative aspiration for heme or cerebrospinal fluid, 0.5 mL 
of bupivacaine 0.5% was injected slowly to avoid forcing the solution away from 
the target points.  Needle were then removed.
 
The L5 dorsal ramus block both on the left hand side and right side was 
performed using a slightly oblique approach under fluoroscopic guidance, placing
the needles within the groove between the sacral ala and the superior articular 
process of S1.  Needles rested on periosteum.  After negative aspiration for 
heme or cerebrospinal fluid, 0.5 mL of bupivacaine 0.5% was injected slowly to 
avoid forcing the solution away from the target sites.  Needles were then 
removed, sterile bandage placed over each injection site.
 
The patient tolerated the procedure well, carefully escorted to recovery room in
stable condition.  No apparent complications.  VAS before procedure rated 
anywhere from 7-8/10, VAS 10 minutes after procedure is 0/10.
 
 
 
 
 
 
 
 
 
 
 
 
 
 
 
 
 
 
 
 
 
 
 
 
 
 
  <ELECTRONICALLY SIGNED>
   By: Bernardo Olivo DO          
  09/21/21     0920
D: 09/14/21 1423                           _____________________________________
T: 09/14/21 2301                           Bernardo Olivo, DO            /nt

## 2021-10-05 ENCOUNTER — HOSPITAL ENCOUNTER (OUTPATIENT)
Dept: HOSPITAL 35 - PAIN | Age: 71
Discharge: HOME | End: 2021-10-05
Attending: ANESTHESIOLOGY
Payer: COMMERCIAL

## 2021-10-05 VITALS — WEIGHT: 159.8 LBS | BODY MASS INDEX: 23.67 KG/M2 | HEIGHT: 69.02 IN

## 2021-10-05 VITALS — SYSTOLIC BLOOD PRESSURE: 115 MMHG | DIASTOLIC BLOOD PRESSURE: 79 MMHG

## 2021-10-05 DIAGNOSIS — Z87.891: ICD-10-CM

## 2021-10-05 DIAGNOSIS — M47.816: ICD-10-CM

## 2021-10-05 DIAGNOSIS — Z98.890: ICD-10-CM

## 2021-10-05 DIAGNOSIS — Z79.899: ICD-10-CM

## 2021-10-05 DIAGNOSIS — M51.36: ICD-10-CM

## 2021-10-05 DIAGNOSIS — M47.817: Primary | ICD-10-CM

## 2021-10-05 DIAGNOSIS — G89.29: ICD-10-CM

## 2021-10-05 DIAGNOSIS — M19.90: ICD-10-CM

## 2021-10-05 DIAGNOSIS — I10: ICD-10-CM

## 2021-10-05 NOTE — HPC
[facility]
[place]   [address]                     PAIN MANAGEMENT CONSULTATION  
_______________________________________________________________________________
 
Name:       WILLARD MARCOS           Room #:                     REG [facility].#:      [id number]                       Account #:      [id number]  
Admission:  10/05/21    Attend Phys:    Bernardo Olivo DO  
Discharge:              Date of Birth:  [birth year]  
                                                          Report #: [id number]
                                                                    [id number] 
_______________________________________________________________________________
THIS REPORT FOR:  
 
cc:  Rajesh Gomez MD, Neal A. MD Johnson, James E. DO                                          ~
 
cc:     Rajesh Gomez MD
DATE OF SERVICE: 10/05/2021
 
CHIEF COMPLAINT:  Low back pain.
 
HISTORY OF PRESENT ILLNESS:  As you know, the patient is a very pleasant 
71-year-old female returning in followup visit to complete radiofrequency 
lesioning of the medial branch nerves of the lumbar spine.  She underwent right 
L3, L4, L5 RFA with 100% improvement in her right low back pain.  She continues 
to experience left low back pain for which she places pain score 5/10.  She 
returns today to complete the left L3, L4, L5 medial branch nerve radiofrequency
lesioning.  She is extremely pleased with response to the initial treatment, 
returning today for the second of the staged procedures.  The patient denies any
injury or trauma that may have led to symptoms development.  There has been no 
change in medication management that would preclude the patient from undergoing 
the radiofrequency lesioning today.
 
ALLERGIES:  No known drug allergies.
 
CURRENT MEDICATIONS:  Gabapentin, bacillus, lisinopril, ibuprofen, 
cholecalciferol, omega-3 fish oil, aspirin, biotin.
 
SOCIAL HISTORY:  The patient denies tobacco, alcohol, or illicit drug use.  She 
is retired, retired years ago, unaccompanied today.
 
IMAGING:  No new imaging available.
 
PQRS:  The patient has known arthritic changes of cervical spine, lumbar spine, 
and bilateral hips.  No rheumatoid arthritis.  Pain intensity is reported today 
at a level of 5/10 on the left and 0/10 on the right.  She is not a fall risk, 
has not had a fall in last 3 months.  She is not on blood thinners, but is 
treated for hypertension.  She is on no chronic opioids and has a low opioid 
addiction potential.  Pain impact is 42/70.
 
PHYSICAL EXAMINATION:
VITAL SIGNS:  Blood pressure 115/79, pulse 73, respiratory rate 16 and 
unlabored.  The patient is 98% on room air.  Height 5 feet 9 inches tall, weight
159.8 pounds, BMI calculated 23.6.
GENERAL:  Well-developed, well-nourished, well-hydrated 71-year-old female, 
appearing stated age, pain is rated 5/10.
 
 
 
[place] [address]                     PAIN MANAGEMENT CONSULTATION  
_______________________________________________________________________________
 
Name:       WILLARD MARCOS           Room #:                     REG [facility].#:      [id number]                       Account #:      [id number]  
Admission:  10/05/21    Attend Phys:    Bernardo Olivo DO  
Discharge:              Date of Birth:  [birth year]  
                                                          Report #: [id number]
                                                                    [id number] 
_______________________________________________________________________________
HEENT:  Normocephalic, atraumatic.  Pupils are round and responsive.
EXTREMITIES:  Show no clubbing, no cyanosis and no edema.
MUSCULOSKELETAL:  Lower extremity strength equal and symmetrical 5/5.  Muscle 
bulk and tone equal and symmetrical.  Seated straight leg raising negative.  
Supine straight leg raising negative.  Fabere's test is negative.  Lumbar 
provocation testing is met with increasing axial back pain on the left.
 
ASSESSMENT:
1.  Lumbosacral spondylosis without radiculopathy.
2.  Facet arthropathy of lumbar spine.
3.  Lumbar degeneration.
4.  Chronic intractable pain.
 
PLAN:
1.  The patient returns today in followup visit to complete radiofrequency 
lesioning of the medial branch nerves of the lumbar spine.  She has successfully
completed right L3, L4, L5 radiofrequency lesioning with 100% improvement in 
overall pain on the right side.  She continues to experience 5/10 pain on the 
left.  She returns to undergo left L3, L4, L5 radiofrequency lesioning today.
2.  No medication changes made at today's visit.  The patient will continue 
current medical therapy as prior prescribed.
3.  We will see the patient back in followup visit on an as needed basis.  We 
are hopeful the patient will once again see good and prolonged benefit with the 
radiofrequency lesioning provided today.
 
DESCRIPTION OF PROCEDURE:  Left L3, L4, L5 medial branch nerve radiofrequency 
ablations.
 
PROCEDURE NOTE:  Procedure was explained.  Informed consent was obtained from 
the patient.  The patient was informed of the risks of the procedure including 
infection, bleeding, nerve damage, failure to produce pain relief, and 
postoperative discomfort lasting for several weeks.
 
The patient was then taken to the fluoroscopy suite, placed in prone position 
with pillow under the abdomen to decrease lumbar lordosis.  Skin overlying 
lumbosacral area then prepped and draped in aseptic fashion.  AP imaging of the 
lumbar spine was used to identify the L2 through L5 vertebral bodies and the 
sacral ala.  Target locations of the left side of the L4 transverse process 
corresponding the L3 medial branch nerve and the L5 transverse process 
corresponding the L4 medial branch nerve were established.  Using a 27-gauge 
1-1/4-inch needle, skin wheals were placed at the junction of the transverse 
process and the respective superior articular process utilizing 1 mL of 1% 
preservative-free lidocaine.  We are careful to only anesthetize the skin and 
not the deep tissue.  Radiofrequency lesioning needles were then advanced under 
fluoroscopic guidance using a superior, inferior, lateral to medial approach to 
the dorsal, superior and medial aspect of the transverse processes.  [place] 
 
 
 
[address] [place]   [address]                     PAIN MANAGEMENT CONSULTATION  
_______________________________________________________________________________
 
Name:       WILLARD MARCOS TERA           Room #:                     REG [facility] DE#:      [id number]                       Account #:      [id number]  
Admission:  10/05/21    Attend Phys:    Bernardo Olivo DO  
Discharge:              Date of Birth:  [birth year]  
                                                          Report #: [id number]
                                                                    [id number] 
_______________________________________________________________________________
were then directed to reach the target locations.  Oblique view facilitated 
needle placement with properly positioned needles in the middle of the eye of 
the German dog.  At each site, needles rested on periosteum.  Touching the bone 
initially assured, the needles were not placed too deeply.
 
Radiofrequency lesioning of the L5 medial branch nerve on the left side was 
performed using a superior, inferior, lateral to medial approach under 
fluoroscopic guidance, placing the needle within the groove between the sacral 
ala and the superior articular process of S1.  Needle rested on periosteum.
 
Stimulation was performed at each level once the cannulas were in position.  
Sensory stimulation was performed at 0.5, 0.3, and 0.2 with good impedance of 
208, 156, and 165 at 50 Hz for the L3, L4, L5 medial branch nerve respectively. 
Good stimulation of the lumbar and buttock region was elicited indicating 
correct alignment with the posterior primary ramus.  Absence of lower motor 
fasciculation was noted at 3 volts 2 Hz stimulation during the testing of the 
L3, L4, L5 medial branch nerves respectively.  Following this, affirmation of 
dissociation between sensory and motor stimulation and negative aspiration noted
at all the levels.  Next, 1 mL of bupivacaine 0.5% was injected slowly.  After a
90-second delay, lesions were performed at temperature of 80 degrees Celsius for
a total of 90 seconds.  After the needle tips cooled, 1 mL of solution 
containing 1 mL 40 mg per mL, 40 mg total triamcinolone along with 3 mL of 
bupivacaine 0.5% injected slowly.  Needle retracted detention, flushed with 1 mL 
of 1% lidocaine, then removed.  Sterile bandage was placed over injection site. 
There were no new motor deficits present in the lower extremities following 
procedure.
 
The patient tolerated the procedure well, carefully escorted to recovery room in
stable condition.  No apparent complications.  After meeting discharge criteria,
the patient discharged home.
 
 
 
 
 
 
 
 
 
 
 
 
 
 
                         
   By:                               
                   
D: 10/05/21 1442                           _____________________________________
T: 10/05/21 2222                           Bernardo Olivo DO            /nt

## 2021-10-05 NOTE — NUR
Pain Clinic Assessment:
 
1. History of Osteoarthritis:
LOW BACK
BILATERAL HIPS
   History of Rheumatoid Arthritis:
DENIES
 
2. Height: 5 ft. 9 in. 175.3 cm.
   Weight: 159.8 lb.  oz. 72.485 kg.
   Patient's BMI: 23.6
 
3. Vital Signs:
   BP: 115/79 Pulse: 73 Resp: 16
   Temp:  02 Sat: 98 ECG Mon:
 
4. Pain Intensity: 5 left rt 0
 
5. Fall Risk:
   Dizziness: N  Needs help standing or walking: N
   Fallen in the last 3 months: N
   Fall risk comments:
 
 
6. Patient on Blood Thinner: None
 
7. History of Hypertension: Y
 
8. Opioid Therapy greater than 6 weeks: N
   Opiate Contract Signed:
 
9. Risk Assessment Tool Provided: LOW RISK 0/3
 
10. Functional Assessment Tool: 42/70
 
11. Recreational Drug Use: Past greater than 3 mos Drug Type:
    Tobacco Use: Former Smoker Tobacco Type:
       Amount or Packs/day:  How Many Years:
    Alcohol Use: Yes  Frequency: Special Occasions Quant:

## 2022-01-19 ENCOUNTER — HOSPITAL ENCOUNTER (OUTPATIENT)
Dept: HOSPITAL 35 - CAT | Age: 72
End: 2022-01-19
Attending: FAMILY MEDICINE
Payer: COMMERCIAL

## 2022-01-19 ENCOUNTER — HOSPITAL ENCOUNTER (OUTPATIENT)
Dept: HOSPITAL 35 - NUC | Age: 72
End: 2022-01-19
Attending: FAMILY MEDICINE
Payer: COMMERCIAL

## 2022-01-19 DIAGNOSIS — E78.00: ICD-10-CM

## 2022-01-19 DIAGNOSIS — M85.88: ICD-10-CM

## 2022-01-19 DIAGNOSIS — M81.0: Primary | ICD-10-CM

## 2022-01-19 DIAGNOSIS — I25.10: ICD-10-CM

## 2022-01-19 DIAGNOSIS — Z13.6: Primary | ICD-10-CM

## 2022-02-11 ENCOUNTER — HOSPITAL ENCOUNTER (OUTPATIENT)
Dept: HOSPITAL 35 - LAB | Age: 72
End: 2022-02-11
Payer: COMMERCIAL

## 2022-02-11 DIAGNOSIS — Z20.822: Primary | ICD-10-CM

## 2022-02-15 ENCOUNTER — HOSPITAL ENCOUNTER (OUTPATIENT)
Dept: HOSPITAL 35 - GI | Age: 72
End: 2022-02-15
Attending: INTERNAL MEDICINE
Payer: COMMERCIAL

## 2022-02-15 VITALS — BODY MASS INDEX: 23.7 KG/M2 | HEIGHT: 69.02 IN | WEIGHT: 160 LBS

## 2022-02-15 DIAGNOSIS — Z98.890: ICD-10-CM

## 2022-02-15 DIAGNOSIS — I10: ICD-10-CM

## 2022-02-15 DIAGNOSIS — Z87.891: ICD-10-CM

## 2022-02-15 DIAGNOSIS — K63.89: ICD-10-CM

## 2022-02-15 DIAGNOSIS — R19.5: Primary | ICD-10-CM

## 2022-02-15 DIAGNOSIS — Z90.49: ICD-10-CM

## 2022-02-15 PROCEDURE — 62900: CPT

## 2022-02-15 PROCEDURE — 62110: CPT
